# Patient Record
Sex: MALE | Race: AMERICAN INDIAN OR ALASKA NATIVE
[De-identification: names, ages, dates, MRNs, and addresses within clinical notes are randomized per-mention and may not be internally consistent; named-entity substitution may affect disease eponyms.]

---

## 2017-01-27 ENCOUNTER — HOSPITAL ENCOUNTER (EMERGENCY)
Dept: HOSPITAL 5 - ED | Age: 27
Discharge: HOME | End: 2017-01-27
Payer: SELF-PAY

## 2017-01-27 VITALS — SYSTOLIC BLOOD PRESSURE: 119 MMHG | DIASTOLIC BLOOD PRESSURE: 79 MMHG
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
## 2017-01-27 DIAGNOSIS — S63.91XA: Primary | ICD-10-CM
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
## 2017-01-27 NOTE — EMERGENCY DEPARTMENT REPORT
Upper Extremity





- HPI


Chief Complaint: Extremity Injury, Upper


Stated Complaint: RT HAND INJURY


Time Seen by Provider: 01/27/17 02:48


Upper Extremity: Right Hand


Occurred When: Today


Mechanism: Other (hit against wall )


Severity: moderate


Symptoms: Yes Pain with Movement, Yes Swelling, No Deformity, No Limited Range 

of Movement, No Numbness, No Weakness, No Bruising/Ecchymosis, No Laceration or 

Abrasion


Other History: 25 y/o male who hit his fist against the wall .pt able to make 

fist .pt able to move fingers.





ED Review of Systems


ROS: 


Stated complaint: RT HAND INJURY


Other details as noted in HPI





Constitutional: denies: chills, fever


Eyes: denies: eye pain, eye discharge, vision change


ENT: denies: ear pain, throat pain


Respiratory: denies: cough, shortness of breath, wheezing


Cardiovascular: denies: chest pain, palpitations


Endocrine: no symptoms reported


Gastrointestinal: denies: abdominal pain, nausea, diarrhea


Genitourinary: denies: urgency, dysuria


Musculoskeletal: denies: back pain, arthralgia


Skin: denies: rash, lesions


Neurological: denies: headache, weakness, paresthesias


Psychiatric: denies: anxiety, depression


Hematological/Lymphatic: denies: easy bleeding, easy bruising





ED Past Medical Hx





- Past Medical History


Previous Medical History?: No





- Surgical History


Past Surgical History?: No





- Medications


Home Medications: 


 Home Medications











 Medication  Instructions  Recorded  Confirmed  Last Taken  Type


 


Ibuprofen [Motrin] 800 mg PO Q8HR PRN #30 tablet 01/27/17  Unknown Rx














Upper Extremity Exam





- Exam


General: 


Vital signs noted. No distress. Alert and acting appropriately.





Head and Torso: No HEENT Abnormality, No Neck Tenderness, No Chest/Lungs 

Abnormality, No Abdominal Tenderness, No Back Tenderness


Shoulder Exam: Yes Normal Range of Motion in Shoulder, No Shoulder Tenderness, 

No Clavicle Tenderness, No Shoulder Deformity, No AC Joint Tenderness


Arm Exam: No Arm/Humerus Tenderness, No Arm Deformity


Elbow: No Elbow Tenderness, No Normal Range of Motion in Elbow, No Elbow 

Deformity


Forearm: No Forearm Tenderness, No Forearm Deformity, No Pain with Pronation, 

No Pain with Supination


Wrist: Yes Normal ROM in Wrist, No Wrist Tenderness, No Wrist Deformity, No 

Snuffbox Tenderness, No Pain with Axial Thumb Compression


Hand: Yes Hand Tenderness, Yes Normal ROM in Digit(s), No Hand Deformity, No 

Digit Tenderness, No Digit(s) Deformity, No Tendon Dysfunction


CMS Exam: No Broken Skin, No Normal Distal Pulses, No Normal Capillary Refill, 

No Normal Distal Sensation





ED Course


 Vital Signs











  01/27/17





  01:58


 


Temperature 98.3 F


 


Pulse Rate 79


 


Respiratory 20





Rate 


 


Blood Pressure 119/79


 


O2 Sat by Pulse 98





Oximetry 














ED Medical Decision Making





- Medical Decision Making


right  hand sprain 


xray show no dislocation  or fracture


mild soft tissue swelling over the medial aspect of hand 





Critical care attestation.: 


If time is entered above; I have spent that time in minutes in the direct care 

of this critically ill patient, excluding procedure time.








ED Disposition


Clinical Impression: 


Hand sprain


Qualifiers:


 Encounter type: initial encounter Laterality: right Qualified Code(s): 

S63.91XA - Sprain of unspecified part of right wrist and hand, initial encounter


Disposition: DISCHARGED TO HOME OR SELFCARE


Is pt being admited?: No


Does the pt Need Aspirin: No


Condition: Stable


Instructions:  Hand Sprain (ED)


Prescriptions: 


Ibuprofen [Motrin] 800 mg PO Q8HR PRN #30 tablet


 PRN Reason: Pain


Referrals: 


PRIMARY CARE,MD [Primary Care Provider] - 3-5 Days


ALBERTO PERRY MD [Staff Physician] - 3-5 Days


Forms:  Work/School Release Form(ED)


Time of Disposition: 03:27

## 2017-01-27 NOTE — XRAY REPORT
FINAL REPORT



PROCEDURE:  XR HAND 2V RT



TECHNIQUE:  RIGHT hand radiographs, AP, lateral, and oblique

views. CPT 79171-PN







HISTORY:  STRUCK A WALL, SWELLING AND PAIN 



COMPARISON:  No prior studies are available for comparison.



FINDINGS:  

Fracture (s) and/or Dislocation(s): None .



Alignment: Normal .



Joint space(s): Normal .



Soft tissues: Mild soft tissue swelling over the medial aspect of

the hand.



Bone mineralization: Normal .



Foreign bodies: None .







IMPRESSION:  

There is no evidence of an acute fracture or dislocation. Mild

soft tissue swelling over the medial aspect of the hand.

## 2020-04-12 ENCOUNTER — HOSPITAL ENCOUNTER (EMERGENCY)
Dept: HOSPITAL 5 - ED | Age: 30
Discharge: HOME | End: 2020-04-12
Payer: SELF-PAY

## 2020-04-12 VITALS — SYSTOLIC BLOOD PRESSURE: 133 MMHG | DIASTOLIC BLOOD PRESSURE: 84 MMHG

## 2020-04-12 DIAGNOSIS — R51: ICD-10-CM

## 2020-04-12 DIAGNOSIS — J32.1: Primary | ICD-10-CM

## 2020-04-12 DIAGNOSIS — Z79.899: ICD-10-CM

## 2020-04-12 DIAGNOSIS — F17.200: ICD-10-CM

## 2020-04-12 PROCEDURE — 99282 EMERGENCY DEPT VISIT SF MDM: CPT

## 2020-04-12 NOTE — EMERGENCY DEPARTMENT REPORT
Vomiting/Diarrhea





- HPI


Chief Complaint: Nausea/Vomiting/Diarrhea


Stated Complaint: VOMITTING,COUGH


Time Seen by Provider: 04/12/20 18:59


Duration: 1 Day


Severity: mild


Nausea/Vomiting Severity: Mild (Has resolved)


Pain Severity: None


Symptoms: Yes Able to Tolerate Fluids, Yes Family w/ Similar Symptoms, Yes 

Recent URI Symptoms (Intermittent cough), No Fever, No Recent Unusual Foods, No 

Recent Untreated Water, No Rash, No Hematuria


Other History: 29-year-old -American male presents to the emergency room 

complaining of nausea vomiting twice at work today.  Patient reports he was sent

home and told that he had to get a work note to return back.  Patient currently 

reports he feels much better but only has a headache now.  Patient states that 

his headache is located in the frontal just above his eyebrows.  Patient reports

he has not vomited since 3 PM denies any nausea at this time.  He does report a 

sick contact at home with son had same symptoms but resolved.





ED Review of Systems


ROS: 


Stated complaint: VOMITTING,COUGH


Other details as noted in HPI





Comment: All other systems reviewed and negative





ED Past Medical Hx





- Social History


Smoking Status: Current Every Day Smoker


Substance Use Type: Alcohol





- Medications


Home Medications: 


                                Home Medications











 Medication  Instructions  Recorded  Confirmed  Last Taken  Type


 


Ibuprofen [Motrin] 800 mg PO Q8HR PRN #30 tablet 01/27/17  Unknown Rx














Vomiting Diarrhea Exam





- Exam


General: 


Vital signs noted. No distress. Alert and acting appropriately.





HEENT: Yes Moist Mucous Membranes, Yes Frontal Tenderness, No Pharyngeal 

Erythema, No Pharyngeal Exudates, No Rhinorrhea, No Conjuctival Injection, No 

Maxillary Tenderness


Neck: No Adenopathy, No Rigidity


Lungs: Yes Clear Lung Sounds, Yes Good Air Exchange, No Wheezes, No Stridor, No 

Cough, No Nasal Flaring, No Retractions, No Use of Accessory Muscles


Heart exam: Regular: Yes, Murmur: No, Tachycardia: No


Abdomen: Tenderness: No, Peritoneal Signs: No, Distention: No, Hyperactive Bowel

 sounds: No


Neurologic: 


Alert and oriented, no deficits.








Musculoskeletal: 


Unremarkable.











ED Course


                                   Vital Signs











  04/12/20





  18:02


 


Temperature 97.6 F


 


Pulse Rate 73


 


Respiratory 20





Rate 


 


Blood Pressure 133/84


 


O2 Sat by Pulse 98





Oximetry 














ED Medical Decision Making





- Medical Decision Making





1-year-old -American male presents to the emergency room complaining of 

nausea vomiting twice at work today.  Patient reports he was sent home and told 

that he had to get a work note to return back.  Patient currently reports he 

feels much better but only has a headache now.  Patient states that his headache

 is located in the frontal just above his eyebrows.  Patient reports he has not 

vomited since 3 PM denies any nausea at this time.  He does report a sick 

contact at home with son had same symptoms but resolved.











Patient will be given ibuprofen and p.o. challenge.  Patient be discharged home 

and instructed to take over-the-counter Claritin or Zyrtec's for sinuses.  He 

can take ibuprofen or Tylenol for headache.


Critical care attestation.: 


If time is entered above; I have spent that time in minutes in the direct care 

of this critically ill patient, excluding procedure time.








ED Disposition


Clinical Impression: 


 Sinusitis chronic, frontal





Headache


Qualifiers:


 Headache type: unspecified Headache chronicity pattern: acute headache 

Intractability: intractable Qualified Code(s): R51 - Headache





Disposition: DC-01 TO HOME OR SELFCARE


Is pt being admited?: No


Does the pt Need Aspirin: No


Condition: Stable


Instructions:  Acute Headache (ED), Sinusitis (ED)


Additional Instructions: 


Please try taking over-the-counter Tylenol ibuprofen Claritin and or Zyrtec's.  

Increase your water intake.  Follow-up with your primary care provider.


Referrals: 


PRIMARY CARE,MD [Primary Care Provider] - 3-5 Days


GRACE ARTEAGA MD [Staff Physician] - 3-5 Days


[facility] [Provider Group] - 3-5 Days


Forms:  Work/School Release Form(ED)

## 2020-09-24 ENCOUNTER — HOSPITAL ENCOUNTER (EMERGENCY)
Dept: HOSPITAL 5 - ED | Age: 30
Discharge: HOME | End: 2020-09-24
Payer: SELF-PAY

## 2020-09-24 VITALS — DIASTOLIC BLOOD PRESSURE: 80 MMHG | SYSTOLIC BLOOD PRESSURE: 136 MMHG

## 2020-09-24 DIAGNOSIS — Z79.899: ICD-10-CM

## 2020-09-24 DIAGNOSIS — J01.20: Primary | ICD-10-CM

## 2020-09-24 PROCEDURE — 99282 EMERGENCY DEPT VISIT SF MDM: CPT

## 2020-09-24 NOTE — EMERGENCY DEPARTMENT REPORT
ED ENT HPI





- General


Chief complaint: Headache


Stated complaint: HEADACHES


Time Seen by Provider: 09/24/20 16:05


Source: patient


Mode of arrival: Ambulatory


Limitations: No Limitations





- History of Present Illness


Initial comments: 





Is a pleasant 29-year-old male presents the emergency department chief complaint

of pressure to the frontal and ethmoid sinus area over the past 2 days.  He 

reports it is aggravated by leaning forward he tried to go to work today but he 

was unable to do so.  He denies any fevers, chills, night sweats, dizziness, 

blurry vision, nausea,, diarrhea, chest pain, shortness of breath.  He describes

the pain as pressure and rates a 6 out of 10 in severity.  He denies any known 

past medical history, current medication use or known allergies to medications. 

He denies any sick contacts.





- Related Data


                                  Previous Rx's











 Medication  Instructions  Recorded  Last Taken  Type


 


Ibuprofen [Motrin] 800 mg PO Q8HR PRN #30 tablet 01/27/17 Unknown Rx


 


Guaifenesin/Pseudoephedrne HCl 1 each PO BID #20 tab.er.12h 09/24/20 Unknown Rx





[Mucinex D -60 mg Tablet]    


 


Naproxen 500 mg PO BID #20 tablet 09/24/20 Unknown Rx


 


methylPREDNISolone [Medrol 4MG 4 mg PO DAILY #1 tab.ds.pk 09/24/20 Unknown Rx





DOSEPAK (21 tabs)]    











                                    Allergies











Allergy/AdvReac Type Severity Reaction Status Date / Time


 


No Known Allergies Allergy   Verified 01/27/17 01:58














ED Dental HPI





- General


Chief complaint: Headache


Stated complaint: HEADACHES


Time Seen by Provider: 09/24/20 16:05


Source: patient


Mode of arrival: Ambulatory


Limitations: No Limitations





- Related Data


                                  Previous Rx's











 Medication  Instructions  Recorded  Last Taken  Type


 


Ibuprofen [Motrin] 800 mg PO Q8HR PRN #30 tablet 01/27/17 Unknown Rx


 


Guaifenesin/Pseudoephedrne HCl 1 each PO BID #20 tab.er.12h 09/24/20 Unknown Rx





[Mucinex D -60 mg Tablet]    


 


Naproxen 500 mg PO BID #20 tablet 09/24/20 Unknown Rx


 


methylPREDNISolone [Medrol 4MG 4 mg PO DAILY #1 tab.ds.pk 09/24/20 Unknown Rx





DOSEPAK (21 tabs)]    











                                    Allergies











Allergy/AdvReac Type Severity Reaction Status Date / Time


 


No Known Allergies Allergy   Verified 01/27/17 01:58














ED Review of Systems


ROS: 


Stated complaint: HEADACHES


Other details as noted in HPI





Comment: All other systems reviewed and negative


Constitutional: denies: chills, fever


Eyes: denies: eye pain, eye discharge, vision change


ENT: as per HPI, congestion.  denies: ear pain, throat pain


Respiratory: denies: cough, shortness of breath, wheezing


Cardiovascular: denies: chest pain, palpitations


Endocrine: no symptoms reported


Gastrointestinal: denies: abdominal pain, nausea, diarrhea


Genitourinary: denies: urgency, dysuria


Musculoskeletal: denies: back pain, joint swelling, arthralgia


Skin: denies: rash, lesions


Neurological: as per HPI, headache.  denies: weakness, paresthesias


Psychiatric: denies: anxiety, depression


Hematological/Lymphatic: denies: easy bleeding, easy bruising





ED Past Medical Hx





- Past Medical History


Previous Medical History?: No





- Surgical History


Past Surgical History?: No





- Social History


Smoking Status: Never Smoker





- Medications


Home Medications: 


                                Home Medications











 Medication  Instructions  Recorded  Confirmed  Last Taken  Type


 


Ibuprofen [Motrin] 800 mg PO Q8HR PRN #30 tablet 01/27/17  Unknown Rx


 


Guaifenesin/Pseudoephedrne HCl 1 each PO BID #20 tab.er.12h 09/24/20  Unknown Rx





[Mucinex D -60 mg Tablet]     


 


Naproxen 500 mg PO BID #20 tablet 09/24/20  Unknown Rx


 


methylPREDNISolone [Medrol 4MG 4 mg PO DAILY #1 tab.ds.pk 09/24/20  Unknown Rx





DOSEPAK (21 tabs)]     














ED Physical Exam





- General


Limitations: No Limitations


General appearance: alert, in no apparent distress





- Head


Head exam: Present: atraumatic, normocephalic





- Eye


Eye exam: Present: normal appearance, PERRL, EOMI


Pupils: Present: normal accommodation





- ENT


ENT exam: Present: normal exam, normal orophraynx, mucous membranes moist, other

(Tenderness percussion of the bilateral frontal and ethmoid sinuses.  Nasal 

turbinate erythema.)





- Neck


Neck exam: Present: normal inspection, tenderness, full ROM.  Absent: 

meningismus (Full active range of motion without pain, negative Kernig and 

presents his), lymphadenopathy





- Respiratory


Respiratory exam: Present: normal lung sounds bilaterally.  Absent: respiratory 

distress, wheezes, rales, rhonchi, stridor





- Cardiovascular


Cardiovascular Exam: Present: regular rate, normal rhythm.  Absent: systolic 

murmur, diastolic murmur, rubs, gallop





- GI/Abdominal


GI/Abdominal exam: Present: soft, normal bowel sounds.  Absent: distended, 

tenderness, guarding, rebound





- Rectal


Rectal exam: Present: deferred





- Extremities Exam


Extremities exam: Present: normal inspection, full ROM, normal capillary refill.

 Absent: tenderness, calf tenderness





- Back Exam


Back exam: Present: normal inspection, full ROM.  Absent: tenderness, CVA 

tenderness (R), CVA tenderness (L)





- Neurological Exam


Neurological exam: Present: alert, oriented X3, CN II-XII intact, normal gait





- Psychiatric


Psychiatric exam: Present: normal affect, normal mood





- Skin


Skin exam: Present: warm, dry, intact, normal color.  Absent: rash





ED Course


                                   Vital Signs











  09/24/20





  15:10


 


Temperature 98.5 F


 


Pulse Rate 65


 


Respiratory 18





Rate 


 


Blood Pressure 136/80


 


O2 Sat by Pulse 99





Oximetry 














ED Medical Decision Making





- Medical Decision Making





Patient had no symptoms of meningitis.  His headache was gradual in onset and 

there were no thunderclap onset making SAH unlikely.  The patient's exam and 

symptoms are consistent with acute sinusitis which think is likely viral or 

allergic in nature.  We will treat with corticosteroids, antihistamines and 

decongestants and recommended outpatient follow-up with primary care doctor.  

Patient instructed to return to the emerge department change worsening symptoms.

 He verbalized understand the diagnosis, treatment plan and follow-up 

instructions and all his questions were answered.





- Differential Diagnosis


Acute sinusitis, allergic rhinitis, migraine headache


Critical care attestation.: 


If time is entered above; I have spent that time in minutes in the direct care 

of this critically ill patient, excluding procedure time.








ED Disposition


Clinical Impression: 


Acute sinusitis


Qualifiers:


 Sinusitis location: ethmoidal Recurrence: non-recurrent Qualified Code(s): 

J01.20 - Acute ethmoidal sinusitis, unspecified





Disposition: DC-01 TO HOME OR SELFCARE


Is pt being admited?: No


Condition: Stable


Instructions:  Sinusitis (ED)


Prescriptions: 


methylPREDNISolone [Medrol 4MG DOSEPAK (21 tabs)] 4 mg PO DAILY #1 tab.ds.pk


Guaifenesin/Pseudoephedrne HCl [Mucinex D -60 mg Tablet] 1 each PO BID #20

tab.er.12h


Naproxen 500 mg PO BID #20 tablet


Referrals: 


[facility] [Provider Group] - 3-5 Days


Forms:  Work/School Release Form(ED)


Time of Disposition: 16:10